# Patient Record
Sex: FEMALE | Race: WHITE | NOT HISPANIC OR LATINO | Employment: UNEMPLOYED | URBAN - METROPOLITAN AREA
[De-identification: names, ages, dates, MRNs, and addresses within clinical notes are randomized per-mention and may not be internally consistent; named-entity substitution may affect disease eponyms.]

---
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
## 2025-02-13 ENCOUNTER — OFFICE VISIT (OUTPATIENT)
Dept: OBGYN CLINIC | Facility: OTHER | Age: 20
End: 2025-02-13
Payer: COMMERCIAL

## 2025-02-13 VITALS — BODY MASS INDEX: 22.73 KG/M2 | WEIGHT: 150 LBS | HEIGHT: 68 IN

## 2025-02-13 DIAGNOSIS — M75.21 BICEPS TENDINITIS OF RIGHT SHOULDER: Primary | ICD-10-CM

## 2025-02-13 DIAGNOSIS — S46.811A STRAIN OF LEVATOR SCAPULAE MUSCLE, RIGHT, INITIAL ENCOUNTER: ICD-10-CM

## 2025-02-13 PROCEDURE — 99203 OFFICE O/P NEW LOW 30 MIN: CPT | Performed by: FAMILY MEDICINE

## 2025-02-13 RX ORDER — DROSPIRENONE AND ETHINYL ESTRADIOL 0.02-3(28)
1 KIT ORAL DAILY
COMMUNITY
Start: 2025-02-06

## 2025-02-13 NOTE — PROGRESS NOTES
1. Biceps tendinitis of right shoulder  Ambulatory Referral to Physical Therapy      2. Strain of levator scapulae muscle, right, initial encounter          Orders Placed This Encounter   Procedures    Ambulatory Referral to Physical Therapy        IMAGING STUDIES: (I personally reviewed images in PACS and report):       PAST REPORTS:      ASSESSMENT/PLAN:  Right sided bicep tendinitis  Right sided levator scapula strain    Repeat X-ray next visit: None    Return in about 6 weeks (around 3/27/2025).    Patient instructions below verbally summarized in person during encounter:  Patient Instructions   Patient advised that she has biceps tendinitis of her right biceps. She is allowed to continue to play volleyball, but will begin physical therapy and follow up in 6 months. Patient advised to avoid pain causing motions in her sport if she can. If patient completes physical therapy and continues to have pain we may attempt a bicep tendon injection.      __________________________________________________________________________    HISTORY OF PRESENT ILLNESS:    Patient states that she has been having right shoulder pain since August of 2024. Patient states her pain was at its worse during mid season. Lifting with her shoulder and actively swiningn her arm hurts the most. Patient denies resting her shoulder. Patient has pain with laying on her shoulder. Pain is a 6/10 when in use but 0/10 when in use. Patient also feels pain that radiates into her neck. Patient feels her shoulder pop when she lifts. Patient denies any locking sensation.      Review of Systems      Following history reviewed and update:    No past medical history on file.  No past surgical history on file.  Social History   Social History     Substance and Sexual Activity   Alcohol Use Never     Social History     Substance and Sexual Activity   Drug Use Never     Social History     Tobacco Use   Smoking Status Never   Smokeless Tobacco Never     No family  "history on file.  No Known Allergies       Physical Exam  Ht 5' 8\" (1.727 m)   Wt 68 kg (150 lb)   BMI 22.81 kg/m²         Ortho Exam  RIGHT SHOULDER:  Erythema: no  Swelling: no  Increased Warmth: no    Tenderness: Insertion of levator scapula. + Anterior shoulder    ROM  Touchdown sign: intact  External Rotation: intact  Internal Rotation: intact    Strength  Abduction: 5/5  ER: 5/5  IR: 5/5    Drop-Arm: negative  Emptycan: Positive (anterior pain)    Villegas: negative  Neer: Positive  Cross-Arm: Negative  Speeds: Positive+  Yergason's: Positive+    Internal Impingement: Negative  (crank position pain)    Labral Crank Test: +  (abducted 90, axial load, guided IR & Er)    Modified Labral Shift: Equivocal  (seated, ER, abduction, axial load, guided abd/add)    Richmond's Test: Equivocal  (FF 90, abd 15, resist thumbs up-, resist thumbs down+)    Apprehension: Negative    LEFT SHOULDER:  Strength  Abduction: 5/5  ER: 5/5  IR: 5/5    ROM  Touchdown sign: intact    Empty can: negative   __________________________________________________________________________  Procedures     "

## 2025-02-18 ENCOUNTER — EVALUATION (OUTPATIENT)
Dept: PHYSICAL THERAPY | Facility: CLINIC | Age: 20
End: 2025-02-18
Payer: COMMERCIAL

## 2025-02-18 DIAGNOSIS — M75.21 BICEPS TENDINITIS OF RIGHT SHOULDER: Primary | ICD-10-CM

## 2025-02-18 PROCEDURE — 97110 THERAPEUTIC EXERCISES: CPT | Performed by: PHYSICAL THERAPIST

## 2025-02-18 PROCEDURE — 97161 PT EVAL LOW COMPLEX 20 MIN: CPT | Performed by: PHYSICAL THERAPIST

## 2025-02-18 NOTE — PROGRESS NOTES
PT Evaluation     Today's date: 2025  Patient name: Judith Stern  : 2005  MRN: [id number]  Referring provider: David Alas DO  Dx:   Encounter Diagnosis     ICD-10-CM    1. Biceps tendinitis of right shoulder  M75.21 Ambulatory Referral to Physical Therapy                     Assessment  Impairments: abnormal or restricted ROM, activity intolerance, impaired physical strength, lacks appropriate home exercise program and pain with function  Functional limitations: overhead lifting; volleyball  Symptom irritability: low    Assessment details: Pt is 20yo female presenting to therapy following chronic Rt shoulder pain Pt is tender to biceps tendon, (+) speeds test, decreased RTC strength, thoracic mobility, and painful prom and arom overhead. Pt would benefit from PT services to improve painfree rom, strength, and return to PLOF.  Understanding of Dx/Px/POC: good     Prognosis: good    Goals  1. Pt will be independent with HEP upon discharge.  2. Pt will improve Rt shoulder rom to WNL and painfree.  3. Pt will improve Rt shoulder strength to 5/5 to lift overhead without pain.  4. Pt will report returning to volleyball serving and hitting without pain.    Plan  Patient would benefit from: skilled physical therapy    Planned therapy interventions: functional ROM exercises, therapeutic activities, therapeutic exercise, therapeutic training, stretching, strengthening, home exercise program, neuromuscular re-education, manual therapy and patient education    Frequency: 2x week  Duration in weeks: 6  Treatment plan discussed with: patient      Subjective Evaluation    History of Present Illness  Mechanism of injury: Pt is [facility]  with chronic Rt shoulder pain starting back in the fall . She reports the pain was the worst during the season. Pt notes some shoulder problems in high school. Pt reports laying on her shoulder, reaching overhead, and behind herself. Pt is right handed. Pt  is currently not practicing or lifting arms. Pt also notes some neck discomfort too.  Working with [employer].  Some exercises include D1, D2, sh ext, X's, pulleys, plank with Y, etc. Nursing major.  Patient Goals  Patient goals for therapy: return to sport/leisure activities  Patient goal: volleyball painfree  Pain  Current pain ratin  At worst pain ratin  Location: Rt shoulder anterior  Quality: sharp        Objective     Palpation     Right   Tenderness of the biceps.     Active Range of Motion   Cervical/Thoracic Spine       Cervical    Flexion:  WFL  Extension:  WFL  Left lateral flexion:  WFL  Right lateral flexion:  WFL  Left rotation:  WFL  Right rotation:  WFL    Thoracic    Extension:  Restriction level: moderate  Left rotation:  WFL  Right rotation:  WFL    Passive Range of Motion   Left Shoulder   Normal passive range of motion    Right Shoulder   Normal passive range of motion    Additional Passive Range of Motion Details  Pain with end range flexion, abduction, and IR    Strength/Myotome Testing     Left Shoulder     Planes of Motion   Flexion: 5   Abduction: 5   External rotation at 90°: 5   Internal rotation at 90°: 5     Right Shoulder     Planes of Motion   Flexion: 4-   Abduction: 4-   External rotation at 90°: 4-   Internal rotation at 90°: 4+     Isolated Muscles   Biceps: 5     Tests     Right Shoulder   Positive Speed's.              Precautions: none      Manuals 2/18            EPAT Biceps tendon 1.8barr R15 15Hz                                                   Neuro Re-Ed             T,Y,W's prone 20x            Thoracic foam roll protocol                                                                              Ther Ex             Thoracic ext supine w/ foam roll 1'            Sidelying ER 20x 5#            Sidelying ABD 20x 5#            Sidelying Flexion 20x 5#            Serratus Punch with twist BUKB             Scap clock with plank             ER Prone ECC ball catch                           Ther Activity                                       Gait Training                                       Modalities

## 2025-02-20 ENCOUNTER — OFFICE VISIT (OUTPATIENT)
Dept: PHYSICAL THERAPY | Facility: CLINIC | Age: 20
End: 2025-02-20
Payer: COMMERCIAL

## 2025-02-20 DIAGNOSIS — M75.21 BICEPS TENDINITIS OF RIGHT SHOULDER: Primary | ICD-10-CM

## 2025-02-20 PROCEDURE — 97110 THERAPEUTIC EXERCISES: CPT | Performed by: PHYSICAL THERAPIST

## 2025-02-20 NOTE — PROGRESS NOTES
Daily Note     Today's date: 2025  Patient name: Judith Stern  : 2005  MRN: [id number]  Referring provider: David Alas DO  Dx:   Encounter Diagnosis     ICD-10-CM    1. Biceps tendinitis of right shoulder  M75.21                      Subjective: Pt reports no change since last visit.      Objective: See treatment diary below      Assessment: Pt was very challenged with stability exercises. Pt had less pain with towel roll for sidelying ER due to   Poor GH mechanics without extra stabilizer of towel roll. Bottoms up KB was very challenging stability wise. Pt does notice a rom difference with thoracic foam roll exercises.    Plan: Continue per plan of care.      Precautions: none      Manuals            EPAT Biceps tendon 1.8barr R15 15Hz NV                                                  Neuro Re-Ed             T,Y,W's prone 20x 20xea 2#           Thoracic foam roll protocol  20xea           Prone OH press  10x                                                               Ther Ex             Thoracic ext supine w/ foam roll 1'            Open book stretch  10x5''  Rt           Sidelying ER 20x 5# 20x 5# towel           Sidelying ABD 20x 5# 20x 5#           Sidelying Flexion 20x 5#            Serratus Punch 3 ways with twist BUKB  10x ea 10#           Scap clock with plank  5x ea yellow           ER Prone ECC ball catch  20x green           UBE  5' retro           Ther Activity                                       Gait Training                                       Modalities

## 2025-02-25 ENCOUNTER — OFFICE VISIT (OUTPATIENT)
Dept: PHYSICAL THERAPY | Facility: CLINIC | Age: 20
End: 2025-02-25
Payer: COMMERCIAL

## 2025-02-25 DIAGNOSIS — M75.21 BICEPS TENDINITIS OF RIGHT SHOULDER: Primary | ICD-10-CM

## 2025-02-25 PROCEDURE — 97110 THERAPEUTIC EXERCISES: CPT | Performed by: PHYSICAL THERAPIST

## 2025-02-25 PROCEDURE — 97140 MANUAL THERAPY 1/> REGIONS: CPT | Performed by: PHYSICAL THERAPIST

## 2025-02-25 NOTE — PROGRESS NOTES
Daily Note     Today's date: 2025  Patient name: Judith Stern  : 2005  MRN: [id number]  Referring provider: David Alas DO  Dx:   Encounter Diagnosis     ICD-10-CM    1. Biceps tendinitis of right shoulder  M75.21                      Subjective: Pt reports not much change with symptoms yet. Did mention her dad having the same thing and getting a CSI with improvement.      Objective: See treatment diary below      Assessment: Pt showing improvement with neuromuscular stability with any exercises today. Tolerated increased EPAT intensity.      Plan: Continue per plan of care.      Precautions: none      Manuals           EPAT Biceps tendon 1.8barr R15 15Hz NV 2.0barr R15 15Hz                                                 Neuro Re-Ed             T,Y,W's prone 20x 20xea 2# 20xea 2#          Thoracic foam roll protocol  20xea 20xea          Prone OH press  10x 15x                                                              Ther Ex             Thoracic ext supine w/ foam roll 1'            Open book stretch  10x5''  Rt 10x5'' Rt          Sidelying ER 20x 5# 20x 5# towel 20x 5# towel          Sidelying ABD 20x 5# 20x 5# 20x 5#          Sidelying Flexion 20x 5#            Serratus Punch 3 ways with twist BUKB  10x ea 10# 20x ea 10#          Scap clock with plank  5x ea yellow 10xe yellow          ER Prone ECC ball catch  20x green 20x green          UBE  5' retro 5' retro          Ther Activity                                       Gait Training                                       Modalities

## 2025-02-27 ENCOUNTER — APPOINTMENT (OUTPATIENT)
Dept: PHYSICAL THERAPY | Facility: CLINIC | Age: 20
End: 2025-02-27
Payer: COMMERCIAL

## 2025-03-04 ENCOUNTER — OFFICE VISIT (OUTPATIENT)
Dept: PHYSICAL THERAPY | Facility: CLINIC | Age: 20
End: 2025-03-04
Payer: COMMERCIAL

## 2025-03-04 DIAGNOSIS — M75.21 BICEPS TENDINITIS OF RIGHT SHOULDER: Primary | ICD-10-CM

## 2025-03-04 PROCEDURE — 97140 MANUAL THERAPY 1/> REGIONS: CPT | Performed by: PHYSICAL THERAPIST

## 2025-03-04 PROCEDURE — 97110 THERAPEUTIC EXERCISES: CPT | Performed by: PHYSICAL THERAPIST

## 2025-03-04 NOTE — PROGRESS NOTES
Daily Note     Today's date: 3/4/2025  Patient name: Judiht Stern  : 2005  MRN: [id number]  Referring provider: David Alas DO  Dx:   Encounter Diagnosis     ICD-10-CM    1. Biceps tendinitis of right shoulder  M75.21                      Subjective: Pt reports she played yesterday for her first practice. She is sore today.       Objective: See treatment diary below      Assessment: Pt is showing improve GH mechanics and stability with all exercises. Pt notes less pain with all exercises and improve rom and stability. Pt also able to increase intensity with EPAT as well. Educated to continue to focus on strengthening and stability work.      Plan: Continue per plan of care.      Precautions: none      Manuals 2/18 2/20 2/25 3/4         EPAT Biceps tendon 1.8barr R15 15Hz NV 2.0barr R15 15Hz 2.4barr R15 15Hz                                                Neuro Re-Ed             T,Y,W's prone 20x 20xea 2# 20xea 2# 20xea 3#         Thoracic foam roll protocol  20xea 20xea 20xea         Prone OH press  10x 15x 15x                                                             Ther Ex             Thoracic ext supine w/ foam roll 1'            Open book stretch  10x5''  Rt 10x5'' Rt 10x5'' Rt         Sidelying ER 20x 5# 20x 5# towel 20x 5# towel 20x 5# towel         Sidelying ABD 20x 5# 20x 5# 20x 5# 20x 5#         Sidelying Flexion 20x 5#            Serratus Punch 3 ways with twist BUKB  10x ea 10# 20x ea 10# 20xea 10#         Scap clock with plank  5x ea yellow 10xe yellow 10xea yellow         ER Prone ECC ball catch  20x green 20x green 20x green         UBE  5' retro 5' retro 5' retro         Ther Activity                                       Gait Training                                       Modalities

## 2025-03-11 ENCOUNTER — APPOINTMENT (OUTPATIENT)
Dept: PHYSICAL THERAPY | Facility: CLINIC | Age: 20
End: 2025-03-11
Payer: COMMERCIAL

## 2025-03-18 ENCOUNTER — OFFICE VISIT (OUTPATIENT)
Dept: PHYSICAL THERAPY | Facility: CLINIC | Age: 20
End: 2025-03-18
Payer: COMMERCIAL

## 2025-03-18 DIAGNOSIS — M75.21 BICEPS TENDINITIS OF RIGHT SHOULDER: Primary | ICD-10-CM

## 2025-03-18 PROCEDURE — 97140 MANUAL THERAPY 1/> REGIONS: CPT | Performed by: PHYSICAL THERAPIST

## 2025-03-18 PROCEDURE — 97110 THERAPEUTIC EXERCISES: CPT | Performed by: PHYSICAL THERAPIST

## 2025-03-18 NOTE — PROGRESS NOTES
Daily Note     Today's date: 3/18/2025  Patient name: Judith Stern  : 2005  MRN: [id number]  Referring provider: David Alas DO  Dx:   Encounter Diagnosis     ICD-10-CM    1. Biceps tendinitis of right shoulder  M75.21                      Subjective: Pt reports she practiced yesterday and isnt too sore. Overall good improvement.      Objective: See treatment diary below      Assessment: Pt is tolerating and showing good improvement with form and less fatigue today. Added some reps for increased challenge. Will continue for one more visit and likely discharge to [facility].      Plan: Continue per plan of care.      Precautions: none      Manuals 2/18 2/20 2/25 3/4 3/18        EPAT Biceps tendon 1.8barr R15 15Hz NV 2.0barr R15 15Hz 2.4barr R15 15Hz 2.7barr R15 15 Hz                                               Neuro Re-Ed             T,Y,W's prone 20x 20xea 2# 20xea 2# 20xea 3# 20xea 3#        Thoracic foam roll protocol  20xea 20xea 20xea 20xea        Prone OH press  10x 15x 15x 20x                                                            Ther Ex             Thoracic ext supine w/ foam roll 1'            Open book stretch  10x5''  Rt 10x5'' Rt 10x5'' Rt 10x5'' Rt        Sidelying ER 20x 5# 20x 5# towel 20x 5# towel 20x 5# towel 30x 5# towel        Sidelying ABD 20x 5# 20x 5# 20x 5# 20x 5# 30x 5#        Sidelying Flexion 20x 5#            Serratus Punch 3 ways with twist BUKB  10x ea 10# 20x ea 10# 20xea 10# 20xea 10#        Scap clock with plank  5x ea yellow 10xe yellow 10xea yellow 10xea red        ER Prone ECC ball catch  20x green 20x green 20x green         Sliders 3xway     5xea        UBE  5' retro 5' retro 5' retro 5' retro        Ther Activity                                       Gait Training                                       Modalities

## 2025-03-25 ENCOUNTER — OFFICE VISIT (OUTPATIENT)
Dept: PHYSICAL THERAPY | Facility: CLINIC | Age: 20
End: 2025-03-25
Payer: COMMERCIAL

## 2025-03-25 DIAGNOSIS — M75.21 BICEPS TENDINITIS OF RIGHT SHOULDER: Primary | ICD-10-CM

## 2025-03-25 PROCEDURE — 97110 THERAPEUTIC EXERCISES: CPT | Performed by: PHYSICAL THERAPIST

## 2025-03-25 NOTE — PROGRESS NOTES
Daily Note     Today's date: 3/25/2025  Patient name: Judith Stern  : 2005  MRN: [id number]  Referring provider: David Alas DO  Dx:   Encounter Diagnosis     ICD-10-CM    1. Biceps tendinitis of right shoulder  M75.21                      Subjective: Pt reports continued overall improvement.      Objective: See treatment diary below      Assessment: Pt tolerated increased EPAT intensity without issue. Pt is showing good improvement with all strengthening exercises. Educated that she should continue shoulder stability and strengthening as part of prehab routine due to repetitive overhead movements with sport. Pt has returned to practice and met goals for discharge.      Plan: Discharge PT services.     Precautions: none      Manuals 2/18 2/20 2/25 3/4 3/18 3/25       EPAT Biceps tendon 1.8barr R15 15Hz NV 2.0barr R15 15Hz 2.4barr R15 15Hz 2.7barr R15 15 Hz 3.1barr R15 15 Hz                                              Neuro Re-Ed             T,Y,W's prone 20x 20xea 2# 20xea 2# 20xea 3# 20xea 3# 20xea 3#       Thoracic foam roll protocol  20xea 20xea 20xea 20xea        Prone OH press  10x 15x 15x 20x 20x 1#                                                           Ther Ex             Thoracic ext supine w/ foam roll 1'            Open book stretch  10x5''  Rt 10x5'' Rt 10x5'' Rt 10x5'' Rt 10x5'' Rt       Sidelying ER 20x 5# 20x 5# towel 20x 5# towel 20x 5# towel 30x 5# towel 30x 5# towel       Sidelying ABD 20x 5# 20x 5# 20x 5# 20x 5# 30x 5# 30x 5#       Sidelying Flexion 20x 5#            Serratus Punch 3 ways with twist BUKB  10x ea 10# 20x ea 10# 20xea 10# 20xea 10# 20xea 10#       Scap clock with plank  5x ea yellow 10xe yellow 10xea yellow 10xea red 10xea red       ER Prone ECC ball catch  20x green 20x green 20x green         Sliders 3xway     5xea 7x ea       UBE  5' retro 5' retro 5' retro 5' retro 5' retro       Ther Activity                                       Gait Training                                        Modalities

## 2025-03-27 ENCOUNTER — OFFICE VISIT (OUTPATIENT)
Dept: OBGYN CLINIC | Facility: OTHER | Age: 20
End: 2025-03-27
Payer: COMMERCIAL

## 2025-03-27 ENCOUNTER — APPOINTMENT (OUTPATIENT)
Dept: RADIOLOGY | Facility: OTHER | Age: 20
End: 2025-03-27
Payer: COMMERCIAL

## 2025-03-27 VITALS — WEIGHT: 150 LBS | BODY MASS INDEX: 22.73 KG/M2 | HEIGHT: 68 IN

## 2025-03-27 DIAGNOSIS — M25.511 ACUTE PAIN OF RIGHT SHOULDER: Primary | ICD-10-CM

## 2025-03-27 DIAGNOSIS — M25.511 ACUTE PAIN OF RIGHT SHOULDER: ICD-10-CM

## 2025-03-27 PROCEDURE — 73030 X-RAY EXAM OF SHOULDER: CPT

## 2025-03-27 PROCEDURE — 99213 OFFICE O/P EST LOW 20 MIN: CPT | Performed by: FAMILY MEDICINE

## 2025-03-27 RX ORDER — NORETHINDRONE ACETATE AND ETHINYL ESTRADIOL 1.5-30(21)
1 KIT ORAL DAILY
COMMUNITY
Start: 2025-03-05 | End: 2026-03-05

## 2025-03-27 NOTE — PATIENT INSTRUCTIONS
I explained the patient that her examination is concerning for persistent pain with possible labral tear.  At this time I recommended MRI arthrogram evaluation.  She will follow-up after imaging performed to consider possible PRP injection based on results.  In the interim she may continue to play volleyball as tolerated.

## 2025-03-27 NOTE — PROGRESS NOTES
1. Acute pain of right shoulder  XR shoulder 2+ vw right    MRI shoulder right wo contrast    FL injection right shoulder (arthrogram)          Orders Placed This Encounter   Procedures    XR shoulder 2+ vw right    MRI shoulder right wo contrast    FL injection right shoulder (arthrogram)        ASSESSMENT/PLAN:  Persistent right shoulder pain   [facility]    Differential diagnosis:  Labral tear  Right sided bicep tendinitis      Repeat X-ray next visit: None    Return for Follow-up after MRI is completed for review.    Patient instructions below verbally summarized in person during encounter:  Patient Instructions   I explained the patient that her examination is concerning for persistent pain with possible labral tear.  At this time I recommended MRI arthrogram evaluation.  She will follow-up after imaging performed to consider possible PRP injection based on results.  In the interim she may continue to play volleyball as tolerated.      ___________________________________________________________________      IMAGING STUDIES: (I personally reviewed images in PACS and report):  X-ray right shoulder 3/27/2025:  No acute osseous normality    PAST REPORTS:    ___________________________________________________________________    HISTORY OF PRESENT ILLNESS:    2/13/2025  Patient states that she has been having right shoulder pain since August of 2024. Patient states her pain was at its worse during mid season. Lifting with her shoulder and actively swiningn her arm hurts the most. Patient denies resting her shoulder. Patient has pain with laying on her shoulder. Pain is a 6/10 when in use but 0/10 when in use. Patient also feels pain that radiates into her neck. Patient feels her shoulder pop when she lifts. Patient denies any locking sensation.    3/27/2025:  Follow-up right shoulder.  Continues to have persistent pain despite physical therapy although mildly improved.  She has  "been able to tolerate volleyball practice however does notice pain with her workouts.  Her examination demonstrates pain with Axtell's test as well as crank test and shear test concerning for labral tear.    Review of Systems      Following history reviewed and update:    No past medical history on file.  No past surgical history on file.  Social History   Social History     Substance and Sexual Activity   Alcohol Use Never     Social History     Substance and Sexual Activity   Drug Use Never     Social History     Tobacco Use   Smoking Status Never   Smokeless Tobacco Never     No family history on file.  No Known Allergies       Physical Exam  Ht 5' 8\" (1.727 m)   Wt 68 kg (150 lb)   BMI 22.81 kg/m²         Ortho Exam  RIGHT SHOULDER:  Tenderness: + Anterior bicipital groove    ROM  Touchdown sign: intact  External Rotation: Intact  Internal Rotation: Intact    Strength  Abduction: 5/5  ER: 5/5  IR: 5/5    Drop-Arm: negative  Emptycan: negative    Internal Impingement:+  (crank position pain)    Labral Crank Test :+  (abducted 90, axial load, guided IR & Er)    Modified Labral Shift:+  (seated, ER, abduction, axial load, guided abd/add)    Axtell's Test: +  (FF 90, abd 15, resist thumbs up-, resist thumbs down+)    Apprehension:  Adarsh's Relocation Maneuver:    LEFT SHOULDER:  Strength  Abduction: 5/5  ER: 5/5  IR: 5/5    ROM  Touchdown sign: intact    Empty can: negative     __________________________________________________________________________  Procedures     "

## 2025-03-28 ENCOUNTER — TELEPHONE (OUTPATIENT)
Dept: OBGYN CLINIC | Facility: CLINIC | Age: 20
End: 2025-03-28

## 2025-03-31 NOTE — NURSING NOTE
Unable to reach patient, sent instructions and via epic my chart phone text. See message below.  Upcoming Radiology appointment at [facility]  Good morning Ms. Hawthorne,     You have an upcoming appointment here at [facility] on 4/8/25 @ 1 pm for a right shoulder MRI arthrogram utilizing Fluoroscopy (x-ray) guidance . Please arrive 15 minutes prior to your appointment time.    [facility] radiology is located at [address] [place] [address] B. Present yourself to admission services that is located on the ground floor to the left of the information desk in Building B to register for your test.. Please sign in using the Kiosk (if any issues with your registration, an admission personnel will assist you). Once registered you can go up to the 1st floor - radiology department (it will be to the right at the main corridor on the 1st floor).      You will have your Fluoro guided procedure of injecting the contrast that will be seen in the MRI directly into your right shoulder then be guided to our MRI suite for your images.     For this test you may eat, drink, take all your usual medications, including aspirin should you take this medication. In regard to driving - if you are not taking any medication for anti-anxiety for the procedure you may drive yourself. BUT  if you are taking medications for anxiety (Xanax, Ativan etc.)  for the procedure you will need a .           These both appointments will be approximately about 2 hours in total.    If you have any questions or concerns regarding the above information,  please feel free to send me a response via a call, email or [employer] chart.      If we have not spoken yet and understand the above information and have no further questions or concerns can you please send me a response via a call, email or [employer] chart that you have received and understood the information.     May you have a good day,   Lucila  Rex Avilez RN  [facility] Radiology RN  [address] [place] [address]  [phone] (Office)  [phone] (Fax)  [email or web address]    Patient returned call to discuss upcoming appointment at [facility] radiology department and complete consultation with patient. Patient is having a right shoulder MRI arthrogram utilizing fluoroscopy guidance. Reviewed patient's allergies, no current anticoagulant medication present per patient, also discussed the pre and post procedure expectations. Patient made aware of need for  post procedure if anti anxiety medication is taken, per patient she will not be taking any medications to prevent her from driving. Reminded patient of location and time expected for procedure, Patient expressed understanding by verbalizing and repeating instructions.

## 2025-04-08 ENCOUNTER — HOSPITAL ENCOUNTER (OUTPATIENT)
Dept: RADIOLOGY | Facility: HOSPITAL | Age: 20
Discharge: HOME | End: 2025-04-08
Payer: COMMERCIAL

## 2025-04-08 DIAGNOSIS — M25.511 ACUTE PAIN OF RIGHT SHOULDER: ICD-10-CM

## 2025-04-08 PROCEDURE — A9585 GADOBUTROL INJECTION: HCPCS | Performed by: FAMILY MEDICINE

## 2025-04-08 PROCEDURE — 77002 NEEDLE LOCALIZATION BY XRAY: CPT

## 2025-04-08 PROCEDURE — 23350 INJECTION FOR SHOULDER X-RAY: CPT

## 2025-04-08 PROCEDURE — 73222 MRI JOINT UPR EXTREM W/DYE: CPT

## 2025-04-08 RX ORDER — LIDOCAINE HYDROCHLORIDE 10 MG/ML
5 INJECTION, SOLUTION EPIDURAL; INFILTRATION; INTRACAUDAL; PERINEURAL
Status: DISCONTINUED | OUTPATIENT
Start: 2025-04-08 | End: 2025-04-09 | Stop reason: HOSPADM

## 2025-04-08 RX ORDER — SODIUM CHLORIDE 9 MG/ML
50 INJECTION INTRAVENOUS
Status: DISCONTINUED | OUTPATIENT
Start: 2025-04-08 | End: 2025-04-09 | Stop reason: HOSPADM

## 2025-04-08 RX ORDER — GADOBUTROL 604.72 MG/ML
2 INJECTION INTRAVENOUS
Status: COMPLETED | OUTPATIENT
Start: 2025-04-08 | End: 2025-04-08

## 2025-04-08 RX ADMIN — IOHEXOL 1 ML: 300 INJECTION, SOLUTION INTRAVENOUS at 14:40

## 2025-04-08 RX ADMIN — GADOBUTROL 0.2 ML: 604.72 INJECTION INTRAVENOUS at 14:40

## 2025-04-09 ENCOUNTER — OFFICE VISIT (OUTPATIENT)
Dept: OBGYN CLINIC | Facility: CLINIC | Age: 20
End: 2025-04-09
Payer: COMMERCIAL

## 2025-04-09 VITALS — HEIGHT: 68 IN | BODY MASS INDEX: 22.73 KG/M2 | WEIGHT: 150 LBS

## 2025-04-09 DIAGNOSIS — S43.431A LABRAL TEAR OF SHOULDER, RIGHT, INITIAL ENCOUNTER: ICD-10-CM

## 2025-04-09 DIAGNOSIS — M25.511 ACUTE PAIN OF RIGHT SHOULDER: Primary | ICD-10-CM

## 2025-04-09 PROCEDURE — 99213 OFFICE O/P EST LOW 20 MIN: CPT | Performed by: FAMILY MEDICINE

## 2025-04-09 NOTE — PATIENT INSTRUCTIONS
I recommend a trial of intra-articular ultrasound-guided right shoulder corticosteroid injection for symptomatic relief.  In the interim she will continue rehabilitation.

## 2025-04-09 NOTE — PROGRESS NOTES
1. Acute pain of right shoulder        2. Labral tear of shoulder, right, initial encounter              No orders of the defined types were placed in this encounter.       ASSESSMENT/PLAN:  Persistent right shoulder pain  Extravasation of contrast on MRI arthrogram suspicious for inferior glenoid labral tear   [facility]      Repeat X-ray next visit: None    Return for Follow-up for Ultrasound Guided Injection.    Patient instructions below verbally summarized in person during encounter:  Patient Instructions   I recommend a trial of intra-articular ultrasound-guided right shoulder corticosteroid injection for symptomatic relief.  In the interim she will continue rehabilitation.      ___________________________________________________________________      IMAGING STUDIES: (I personally reviewed images in PACS and report):      MRI right shoulder arthrogram 4/8/2025:  There is contrast extravasation from the inferior aspect of the joint capsule raising suspicion for tear of the inferior glenohumeral labral ligamentous complex. The glenoid labrum itself appears intact.     Intact rotator cuff and long head biceps tendon.    PAST REPORTS:  X-ray right shoulder 3/27/2025:  No acute osseous normality  ___________________________________________________________________    HISTORY OF PRESENT ILLNESS:    2/13/2025  Patient states that she has been having right shoulder pain since August of 2024. Patient states her pain was at its worse during mid season. Lifting with her shoulder and actively swiningn her arm hurts the most. Patient denies resting her shoulder. Patient has pain with laying on her shoulder. Pain is a 6/10 when in use but 0/10 when in use. Patient also feels pain that radiates into her neck. Patient feels her shoulder pop when she lifts. Patient denies any locking sensation.    3/27/2025:  Follow-up right shoulder.  Continues to have persistent pain despite physical  "therapy although mildly improved.  She has been able to tolerate volleyball practice however does notice pain with her workouts.  Her examination demonstrates pain with Jack's test as well as crank test and shear test concerning for labral tear.    Review of Systems      Following history reviewed and update:    No past medical history on file.  Past Surgical History:   Procedure Laterality Date    FL INJECTION RIGHT SHOULDER (ARTHROGRAM)  4/8/2025     Social History   Social History     Substance and Sexual Activity   Alcohol Use Never     Social History     Substance and Sexual Activity   Drug Use Never     Social History     Tobacco Use   Smoking Status Never   Smokeless Tobacco Never     No family history on file.  No Known Allergies       Physical Exam  Ht 5' 8\" (1.727 m)   Wt 68 kg (150 lb)   BMI 22.81 kg/m²         Ortho Exam  RIGHT SHOULDER:  Erythema: no  Swelling: no  Increased Warmth: no    Tenderness:     ROM  Touchdown sign: intact  External Rotation: Intact  Internal Rotation: Intact    Strength  Abduction: 5/5  ER: 5/5  IR: 5/5    Drop-Arm: negative    Internal Impingement:  (crank position pain)    Labral Crank Test :+  (abducted 90, axial load, guided IR & Er)    Modified Labral Shift:  (seated, ER, abduction, axial load, guided abd/add)    Jack's Test: +  (FF 90, abd 15, resist thumbs up-, resist thumbs down+)    Apprehension:  Adarsh's Relocation Maneuver:    LEFT SHOULDER:  Strength  Abduction: 5/5  ER: 5/5  IR: 5/5    ROM  Touchdown sign: intact    Empty can: negative   __________________________________________________________________________  Procedures     "

## 2025-04-10 ENCOUNTER — PROCEDURE VISIT (OUTPATIENT)
Dept: OBGYN CLINIC | Facility: OTHER | Age: 20
End: 2025-04-10
Payer: COMMERCIAL

## 2025-04-10 VITALS — WEIGHT: 150 LBS | BODY MASS INDEX: 22.73 KG/M2 | HEIGHT: 68 IN

## 2025-04-10 DIAGNOSIS — S43.431A LABRAL TEAR OF SHOULDER, RIGHT, INITIAL ENCOUNTER: Primary | ICD-10-CM

## 2025-04-10 PROCEDURE — 20611 DRAIN/INJ JOINT/BURSA W/US: CPT | Performed by: FAMILY MEDICINE

## 2025-04-10 RX ADMIN — TRIAMCINOLONE ACETONIDE 40 MG: 40 INJECTION, SUSPENSION INTRA-ARTICULAR; INTRAMUSCULAR at 17:30

## 2025-04-10 RX ADMIN — LIDOCAINE HYDROCHLORIDE 4 ML: 10 INJECTION, SOLUTION INFILTRATION; PERINEURAL at 17:30

## 2025-04-16 RX ORDER — LIDOCAINE HYDROCHLORIDE 10 MG/ML
4 INJECTION, SOLUTION INFILTRATION; PERINEURAL
Status: COMPLETED | OUTPATIENT
Start: 2025-04-10 | End: 2025-04-10

## 2025-04-16 RX ORDER — TRIAMCINOLONE ACETONIDE 40 MG/ML
40 INJECTION, SUSPENSION INTRA-ARTICULAR; INTRAMUSCULAR
Status: COMPLETED | OUTPATIENT
Start: 2025-04-10 | End: 2025-04-10

## 2025-04-16 NOTE — PROGRESS NOTES
"1. Labral tear of shoulder, right, initial encounter          Orders Placed This Encounter   Procedures    Large joint arthrocentesis          ASSESSMENT/PLAN:  Persistent right shoulder pain  Extravasation of contrast on MRI arthrogram suspicious for inferior glenoid labral tear   [employer]        Return if symptoms worsen or fail to improve.    Patient instructions below verbally summarized in person during encounter:  Patient Instructions   Trial right shoulder USG CSI GH for labral tear  Continue PT home exercise program  Return to sports as tolerated      _______________________________________________________________________  IMAGING STUDIES: (I personally reviewed images in PACS and report):        MRI right shoulder arthrogram 4/8/2025:  There is contrast extravasation from the inferior aspect of the joint capsule raising suspicion for tear of the inferior glenohumeral labral ligamentous complex. The glenoid labrum itself appears intact.     Intact rotator cuff and long head biceps tendon.     PAST REPORTS:  X-ray right shoulder 3/27/2025:  No acute osseous normality  ___    No past medical history on file.  No Known Allergies       Ht 5' 8\" (1.727 m)   Wt 68 kg (150 lb)   BMI 22.81 kg/m²     __________________________________________________________________________  Large joint arthrocentesis: R glenohumeral  Universal Protocol:  procedure performed by consultantConsent: Verbal consent obtained.  Risks and benefits: risks, benefits and alternatives were discussed  Consent given by: patient  Time out: Immediately prior to procedure a \"time out\" was called to verify the correct patient, procedure, equipment, support staff and site/side marked as required.  Patient understanding: patient states understanding of the procedure being performed  Site marked: the operative site was marked  Patient identity confirmed: verbally with patient  Supporting " Documentation  Indications: pain and diagnostic evaluation   Procedure Details  Location: shoulder - R glenohumeral  Preparation: Patient was prepped and draped in the usual sterile fashion  Needle size: 22 G  Ultrasound guidance: yes  Approach: posterior  Medications administered: 4 mL lidocaine 1 %; 40 mg triamcinolone acetonide 40 mg/mL    Patient tolerance: patient tolerated the procedure well with no immediate complications  Dressing:  Sterile dressing applied

## 2025-04-16 NOTE — PATIENT INSTRUCTIONS
Trial right shoulder USG CSI GH for labral tear  Continue PT home exercise program  Return to sports as tolerated